# Patient Record
Sex: MALE | Race: WHITE
[De-identification: names, ages, dates, MRNs, and addresses within clinical notes are randomized per-mention and may not be internally consistent; named-entity substitution may affect disease eponyms.]

---

## 2019-12-23 ENCOUNTER — HOSPITAL ENCOUNTER (OUTPATIENT)
Dept: HOSPITAL 47 - LABWHC1 | Age: 84
Discharge: HOME | End: 2019-12-23
Attending: INTERNAL MEDICINE
Payer: MEDICARE

## 2019-12-23 DIAGNOSIS — I49.3: ICD-10-CM

## 2019-12-23 DIAGNOSIS — I25.10: Primary | ICD-10-CM

## 2019-12-23 DIAGNOSIS — E78.5: ICD-10-CM

## 2019-12-23 LAB
ALBUMIN SERPL-MCNC: 4.1 G/DL (ref 3.8–4.9)
ALBUMIN/GLOB SERPL: 1.95 G/DL (ref 1.6–3.17)
ALP SERPL-CCNC: 69 U/L (ref 41–126)
ALT SERPL-CCNC: 14 U/L (ref 10–49)
ANION GAP SERPL CALC-SCNC: 7 MMOL/L (ref 4–12)
AST SERPL-CCNC: 18 U/L (ref 14–35)
BUN SERPL-SCNC: 27 MG/DL (ref 9–27)
BUN/CREAT SERPL: 19.29 RATIO (ref 12–20)
CALCIUM SPEC-MCNC: 8.9 MG/DL (ref 8.7–10.3)
CHLORIDE SERPL-SCNC: 108 MMOL/L (ref 96–109)
CHOLEST SERPL-MCNC: 126 MG/DL (ref 0–200)
CO2 SERPL-SCNC: 25 MMOL/L (ref 21.6–31.8)
GLOBULIN SER CALC-MCNC: 2.1 G/DL (ref 1.6–3.3)
GLUCOSE SERPL-MCNC: 104 MG/DL (ref 70–110)
HDLC SERPL-MCNC: 56 MG/DL (ref 40–60)
LDLC SERPL CALC-MCNC: 50.6 MG/DL (ref 0–131)
MAGNESIUM SPEC-SCNC: 2.1 MG/DL (ref 1.5–2.4)
POTASSIUM SERPL-SCNC: 4.1 MMOL/L (ref 3.5–5.5)
PROT SERPL-MCNC: 6.2 G/DL (ref 6.2–8.2)
SODIUM SERPL-SCNC: 140 MMOL/L (ref 135–145)
TRIGL SERPL-MCNC: 97 MG/DL (ref 0–149)
VLDLC SERPL CALC-MCNC: 19.4 MG/DL (ref 5–40)

## 2019-12-23 PROCEDURE — 83735 ASSAY OF MAGNESIUM: CPT

## 2019-12-23 PROCEDURE — 80061 LIPID PANEL: CPT

## 2019-12-23 PROCEDURE — 36415 COLL VENOUS BLD VENIPUNCTURE: CPT

## 2019-12-23 PROCEDURE — 80053 COMPREHEN METABOLIC PANEL: CPT

## 2020-07-27 ENCOUNTER — HOSPITAL ENCOUNTER (OUTPATIENT)
Dept: HOSPITAL 47 - RADUSWWP | Age: 85
Discharge: HOME | End: 2020-07-27
Attending: FAMILY MEDICINE
Payer: MEDICARE

## 2020-07-27 DIAGNOSIS — N18.9: ICD-10-CM

## 2020-07-27 DIAGNOSIS — K82.4: Primary | ICD-10-CM

## 2020-07-27 DIAGNOSIS — R16.0: ICD-10-CM

## 2020-07-27 DIAGNOSIS — E80.7: ICD-10-CM

## 2020-07-27 PROCEDURE — 76705 ECHO EXAM OF ABDOMEN: CPT

## 2020-07-27 NOTE — US
EXAMINATION TYPE: US abdomen limited

 

DATE OF EXAM: 7/27/2020

 

COMPARISON: NONE

 

CLINICAL HISTORY: N18.9 CKD, E80.7. Biliary disorder 

 

EXAM MEASUREMENTS:

 

Liver Length:  17.1 cm   

Gallbladder Wall:  0.2 cm   

CBD:  0.4 cm

Right Kidney:  11.5 x 5.2 x 5.7 cm

 

 

 

Pancreas:  Obscured by bowel gas, visualized portions appear wnl

Liver:  Measuring enlarged. Normal less than 15.5 cm

Gallbladder:  Possible polyp visualized measuring 0.4 cm 

**Evidence for sonographic Aguiar's sign:  No

CBD:  wnl as visualized 

Right Kidney: No hydronephrosis. Loss of corticomedullary differentiation  

 

 

 

IMPRESSION: 

1. Hepatomegaly.

2. Gallbladder polyp

## 2020-08-06 ENCOUNTER — HOSPITAL ENCOUNTER (OUTPATIENT)
Dept: HOSPITAL 47 - RADUSWWP | Age: 85
Discharge: HOME | End: 2020-08-06
Attending: FAMILY MEDICINE
Payer: MEDICARE

## 2020-08-06 DIAGNOSIS — I12.9: Primary | ICD-10-CM

## 2020-08-06 PROCEDURE — 76770 US EXAM ABDO BACK WALL COMP: CPT

## 2020-08-07 NOTE — US
EXAMINATION TYPE: US kidneys/renal and bladder

 

DATE OF EXAM: 8/6/2020

 

COMPARISON: NONE

 

CLINICAL HISTORY: I12.9 CKD. CKD

 

EXAM MEASUREMENTS:

 

Right Kidney:  10.3 x 5.0 x 5.4 cm

Left Kidney: 10.4 x 5.1 x 5.5 cm

 

 

 

Right Kidney: no hydronephrosis, nephrolithiasis or masses seen, cortical thinning  

Left Kidney: no hydronephrosis, nephrolithiasis or masses seen, cortical thinning  

Bladder: not fully distended

Bladder limited by incomplete distention.

 

 

IMPRESSION:

Bilateral cortical thinning compatible with chronic medical renal disease.